# Patient Record
Sex: MALE | Race: WHITE | NOT HISPANIC OR LATINO | URBAN - METROPOLITAN AREA
[De-identification: names, ages, dates, MRNs, and addresses within clinical notes are randomized per-mention and may not be internally consistent; named-entity substitution may affect disease eponyms.]

---

## 2019-09-08 ENCOUNTER — EMERGENCY (EMERGENCY)
Facility: HOSPITAL | Age: 69
LOS: 1 days | Discharge: HOME | End: 2019-09-08
Attending: EMERGENCY MEDICINE | Admitting: EMERGENCY MEDICINE
Payer: MEDICARE

## 2019-09-08 VITALS
SYSTOLIC BLOOD PRESSURE: 141 MMHG | HEART RATE: 72 BPM | TEMPERATURE: 98 F | DIASTOLIC BLOOD PRESSURE: 95 MMHG | HEIGHT: 67 IN | RESPIRATION RATE: 16 BRPM | WEIGHT: 175.05 LBS | OXYGEN SATURATION: 100 %

## 2019-09-08 PROCEDURE — 99283 EMERGENCY DEPT VISIT LOW MDM: CPT

## 2019-09-08 RX ORDER — BACITRACIN ZINC 500 UNIT/G
1 OINTMENT IN PACKET (EA) TOPICAL
Qty: 1 | Refills: 0
Start: 2019-09-08 | End: 2019-09-14

## 2019-09-08 RX ADMIN — Medication 600 MILLIGRAM(S): at 10:47

## 2019-09-08 NOTE — ED PROVIDER NOTE - CLINICAL SUMMARY MEDICAL DECISION MAKING FREE TEXT BOX
76 y/o male with mosquito bite with cellulitic changes and lymphangitis to right hand. Will give one dose of clindamycin here. Since patient is penicillin allergic will d/c with same for the next 7 days to return if symptoms worse.

## 2019-09-08 NOTE — ED PROVIDER NOTE - OBJECTIVE STATEMENT
76 y/o male with history of high blood pressure and GERD here with multiple mosquito bites which he noticed 2 days ago. Two of which are on the hand, one on the upper right arm and the last one on the right side of his back. Pt is concerned if he is infected because he noticed redness  that has been spreading since this morning. Denies fever or chills.

## 2019-09-08 NOTE — ED PROVIDER NOTE - SKIN COLOR
Erythema to the right dorsum of the hand with lymphangitis 8cm above insect bite. No crepitus non tender no fluctuant. Area of erythema surrounding insect bite on the dorsum of the left hand. Erythema to insect bite to the upper right arm adjacent to the axilla with no lymphangitis and erythema surrounding the insect bite on the back with no lymphangitis.

## 2019-09-08 NOTE — ED PROVIDER NOTE - PATIENT PORTAL LINK FT
You can access the FollowMyHealth Patient Portal offered by [facility] by registering at the following website: [email or web address]. By joining [employer]’s FollowMyHealth portal, you will also be able to view your health information using other applications (apps) compatible with our system.

## 2019-09-08 NOTE — ED ADULT TRIAGE NOTE - CHIEF COMPLAINT QUOTE
Got stung by bees on Friday c/o swelling to left hand no respiratory distress noted   PMH: HTN, GERD

## 2021-11-17 ENCOUNTER — EMERGENCY (EMERGENCY)
Facility: HOSPITAL | Age: 71
LOS: 1 days | Discharge: HOME | End: 2021-11-17
Attending: PERSONAL EMERGENCY RESPONSE ATTENDANT | Admitting: EMERGENCY MEDICINE
Payer: MEDICARE

## 2021-11-17 VITALS
RESPIRATION RATE: 18 BRPM | DIASTOLIC BLOOD PRESSURE: 99 MMHG | HEART RATE: 65 BPM | HEIGHT: 67 IN | TEMPERATURE: 98 F | SYSTOLIC BLOOD PRESSURE: 149 MMHG | OXYGEN SATURATION: 100 %

## 2021-11-17 VITALS
SYSTOLIC BLOOD PRESSURE: 144 MMHG | TEMPERATURE: 98 F | DIASTOLIC BLOOD PRESSURE: 82 MMHG | RESPIRATION RATE: 18 BRPM | HEART RATE: 78 BPM | OXYGEN SATURATION: 100 %

## 2021-11-17 PROBLEM — Z78.9 OTHER SPECIFIED HEALTH STATUS: Chronic | Status: ACTIVE | Noted: 2019-09-08

## 2021-11-17 PROCEDURE — 99283 EMERGENCY DEPT VISIT LOW MDM: CPT

## 2021-11-17 RX ORDER — ACYCLOVIR SODIUM 500 MG
1 VIAL (EA) INTRAVENOUS
Qty: 50 | Refills: 0
Start: 2021-11-17 | End: 2021-11-26

## 2021-11-17 RX ORDER — ACYCLOVIR SODIUM 500 MG
800 VIAL (EA) INTRAVENOUS ONCE
Refills: 0 | Status: COMPLETED | OUTPATIENT
Start: 2021-11-17 | End: 2021-11-17

## 2021-11-17 RX ORDER — IBUPROFEN 200 MG
600 TABLET ORAL ONCE
Refills: 0 | Status: COMPLETED | OUTPATIENT
Start: 2021-11-17 | End: 2021-11-17

## 2021-11-17 RX ADMIN — Medication 600 MILLIGRAM(S): at 17:04

## 2021-11-17 RX ADMIN — Medication 800 MILLIGRAM(S): at 17:04

## 2021-11-17 NOTE — ED PROVIDER NOTE - PATIENT PORTAL LINK FT
You can access the FollowMyHealth Patient Portal offered by [facility] by registering at the following website: [email or web address]. By joining [employer]’s FollowMyHealth portal, you will also be able to view your health information using other applications (apps) compatible with our system.

## 2021-11-17 NOTE — ED ADULT TRIAGE NOTE - CHIEF COMPLAINT QUOTE
Pt AOX4 sent by orthopedist for eval of Left hand 4th finger swelling and redness; pt s/p 10 day course of antibiotics, 7 day course of Colchicine; negative blood tests for gout; finger red and sensitive swollen in distal half, unable to bend knuckle; PMHx of HTN

## 2021-11-17 NOTE — ED PROVIDER NOTE - PHYSICAL EXAMINATION
GEN - NAD; well appearing; A+O x3   HEAD - NC/AT     EYES - EOMI, no conjunctival pallor, no scleral icterus  ENT -   mucous membranes  moist , no discharge      NECK - Neck supple  PULM - CTA b/l,  symmetric breath sounds  COR -  RRR, S1 S2, no murmurs  ABD - , ND, NT, soft, no guarding, no rebound, no masses    BACK - no CVA tenderness, nontender spine     EXTREMS - Diffuse swelling over distal L phalanx w/ no fluctuance. Desquamation and erythema noted at distal phalanx. NVI.   SKIN - no rash or bruising      NEUROLOGIC - alert, sensation nl, motor 5/5 RUE/LUE/RLE/LLE

## 2021-11-17 NOTE — ED PROVIDER NOTE - OBJECTIVE STATEMENT
70 y/o M w/ PMHx HTN presents to the ED w/ 3 weeks of L ring finger swelling and TTP. Pt states symptoms started after going to the gym he noticed his distal finger was tender and swelling progressively got worse over the next few days. Pt states he went to urgent care and got a 10 day course of bactrim but the symptoms persisted in spite of the medications. Pt states he went to the PCP who started him on colchicine and tested him for gout. Pt had a negative uric acid serum and was sent to ortho who referred him to the ED for further evaluation. Denies fever, chills, loss of motor and sensory functions in hand.

## 2021-11-17 NOTE — ED PROVIDER NOTE - ATTENDING CONTRIBUTION TO CARE
Attending MD Romero.  Agree with HPI/ROS/PE by resident physician.  Pt endorses sxs x 2 wks that began with small white pustule proximal to L ring finger nailbed and then ruptured followed by progressive swelling/erythema of L ring finger digit.  Finger slightly improved from erythema to proximal phalanx now only extending to middle phalanx.  No predilection of swelling erythema to volar surface of digit, TTP/erythema most appreciable to dorsum of finger.  Site is red, swollen but non-fluctuant, no active drainage, no sig erythema, no tracking beyond PIP.  Pt denies fevers.  Given indolent course that began with small white pustule and lack of improvement with bactrim as well as clinical appearance of pt's digit, most likely dx is herpetic cony.  Pt has been rxed antiviral for tx.  Follow-up closely with hand, return to ED for new/worsening sxs, increased erythema, warmth, swelling, pain, development of any fevers.  Pt verbalizes understanding of above return precautions and follow-up plan.

## 2021-11-17 NOTE — ED PROVIDER NOTE - CLINICAL SUMMARY MEDICAL DECISION MAKING FREE TEXT BOX
70 y/o M p/w 3 weeks of L finger swelling and TTP. 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 Unlikely paronychia given lack of fluctuance. Unlikely tenosynovitis given longevity of symptoms. Will give anti-viral medication, d/c home w/ medications and outpatient f/u.

## 2021-11-17 NOTE — ED PROVIDER NOTE - NSFOLLOWUPINSTRUCTIONS_ED_ALL_ED_FT
Your diagnosis: Herpetic Ivette    You were evaluated in the Emergency Department for finger pain.  You were evaluated and examined by a physician, and based on your evaluation, there are no signs of emergency conditions requiring admission to the hospital on today's workup.  Based on the evaluation, a presumptive diagnosis was made, however, further evaluation may be required by your primary care physician or a specialist for a more definitive diagnosis.  Therefore, please follow-up as directed or return to the Emergency Department if your symptoms change or worsen.    Discharge instructions:    - Please follow up with a Hand Doctor for further evaluation within the next 7 days.    - Acyclovir was sent to your pharmacy, please take twice a day for the next 10 days.     - Tylenol up to 650 mg every 8 hours as needed for pain and/or Motrin up to 600 mg every 6 hours as needed for pain.     - Be sure to return to the ED if you develop new or worsening symptoms. Specific signs and symptoms to be vigilant of: fever or chills, chest pain, difficulty breathing, palpitations, loss of consciousness, headache, vision changes, slurred speech, difficulty swallowing or drooling, facial droop, weakness in the arms or legs, numbness or tingling, abdominal pain, nausea or vomiting, diarrhea, constipation, blood in the stool or urine, pain on urination, difficulty urinating.

## 2021-11-24 PROBLEM — Z00.00 ENCOUNTER FOR PREVENTIVE HEALTH EXAMINATION: Status: ACTIVE | Noted: 2021-11-24

## 2021-11-26 ENCOUNTER — APPOINTMENT (OUTPATIENT)
Dept: ORTHOPEDIC SURGERY | Facility: CLINIC | Age: 71
End: 2021-11-26

## 2023-03-07 NOTE — ED PROVIDER NOTE - CONDITION AT DISCHARGE:
Satisfactory [place]ura Pregnancy And Lactation Text: There is insufficient data to evaluate drug-associated risk for major birth defects, miscarriage, or other adverse maternal or fetal outcomes.  There is a pregnancy registry that monitors pregnancy outcomes in pregnant persons exposed to the medication during pregnancy.  It is unknown if this medication is excreted in breast milk.  Do not breastfeed during treatment and for about 4 weeks after the last dose.